# Patient Record
Sex: FEMALE | URBAN - METROPOLITAN AREA
[De-identification: names, ages, dates, MRNs, and addresses within clinical notes are randomized per-mention and may not be internally consistent; named-entity substitution may affect disease eponyms.]

---

## 2023-03-28 ENCOUNTER — APPOINTMENT (RX ONLY)
Dept: URBAN - METROPOLITAN AREA CLINIC 173 | Facility: CLINIC | Age: 28
Setting detail: DERMATOLOGY
End: 2023-03-28

## 2023-03-28 DIAGNOSIS — Z41.9 ENCOUNTER FOR PROCEDURE FOR PURPOSES OTHER THAN REMEDYING HEALTH STATE, UNSPECIFIED: ICD-10-CM

## 2023-03-28 PROCEDURE — ? FILLERS

## 2023-03-28 PROCEDURE — ? COSMETIC CONSULTATION: BOTULINUM TOXIN

## 2023-03-28 PROCEDURE — ? PRESCRIPTION

## 2023-03-28 PROCEDURE — ? BOTOX

## 2023-03-28 PROCEDURE — ? COSMETIC CONSULTATION: FILLERS

## 2023-03-28 RX ADMIN — Medication: at 00:00

## 2023-03-28 NOTE — PROCEDURE: FILLERS
[place] Filler  Volume In Cc: 0
Additional Area 1 Location: face
Anesthesia Type: 1% lidocaine with epinephrine and a 1:10 solution of 8.4% sodium bicarbonate
Expiration Date (Month Year): 6/13/25
Consent: Written consent obtained. Risks include but not limited to bruising, beading, irregular texture, ulceration, infection, allergic reaction, scar formation, incomplete augmentation, temporary nature, procedural pain.
Include Cannula Information In Note?: No
Include Cannula Size?: 25G
Post-Care Instructions: Patient instructed to apply ice to reduce swelling.
Anesthesia Volume In Cc: 0.3
Include Cannula Length?: 1.5 inch
Lot #: [id number]
Lot #: [id number]
Topical Anesthesia?: 2.5% lidocaine, 2.5% prilocaine
Expiration Date (Month Year): 08/04/2021
Detail Level: Zone
Expiration Date (Month Year): 08/31/2020
Include Cannula Information In Note?: Yes
Additional Area 2 Location: Hands
Price (Use Numbers Only, No Special Characters Or $): 575
Filler: RHA 2
Additional Area 3 Location: chin
Map Statment: See Attach Map for Complete Details
Additional Area 2 Location: ear lobes
Aspiration Statement: Aspiration was performed prior to injecting site with filler.
[place]erm[place] Lips Filler Volume In Cc: 0.5
Lot #: [id number]

## 2023-03-28 NOTE — PROCEDURE: BOTOX
Periorbital Skin Units: 0
Show Lcl Units: No
Lot #: [id number]
Show Lateral Platysmal Band Units: Yes
Additional Area 1 Location: Face
Dilution (U/0.1 Cc): 1
Incrementing Botox Units: By 0.5 Units
Additional Area 1 Units: 2
Additional Area 3 Location: masseters
Detail Level: Zone
Expiration Date (Month Year): 9/25
Price (Use Numbers Only, No Special Characters Or $): 40
Additional Area 2 Location: Upper cutaneous lip
Consent: Written consent obtained. Risks include but not limited to lid/brow ptosis, bruising, swelling, diplopia, temporary effect, incomplete chemical denervation.
Post-Care Instructions: Patient instructed to not lie down for 4 hours and limit physical activity for 24 hours. Patient instructed not to travel by airplane for 48 hours.

## 2023-03-29 RX ORDER — PHARMACY COMPOUNDING ACCESSORY
EACH MISCELLANEOUS
Qty: 30 | Refills: 3 | Status: ERX | COMMUNITY
Start: 2023-03-28

## 2023-04-13 ENCOUNTER — APPOINTMENT (RX ONLY)
Dept: URBAN - METROPOLITAN AREA CLINIC 173 | Facility: CLINIC | Age: 28
Setting detail: DERMATOLOGY
End: 2023-04-13

## 2023-04-13 DIAGNOSIS — Z41.9 ENCOUNTER FOR PROCEDURE FOR PURPOSES OTHER THAN REMEDYING HEALTH STATE, UNSPECIFIED: ICD-10-CM

## 2023-04-13 PROCEDURE — ? OTHER (COSMETIC)

## 2023-04-13 NOTE — PROCEDURE: OTHER (COSMETIC)
Other (Free Text): Patient had Botox and filler on 3-28-23, patient is very happy with results. Advised to return in 3.5 months for next apt.
Detail Level: Zone

## 2023-07-18 ENCOUNTER — RX ONLY (OUTPATIENT)
Age: 28
Setting detail: RX ONLY
End: 2023-07-18

## 2023-07-18 ENCOUNTER — APPOINTMENT (RX ONLY)
Dept: URBAN - METROPOLITAN AREA CLINIC 173 | Facility: CLINIC | Age: 28
Setting detail: DERMATOLOGY
End: 2023-07-18

## 2023-07-18 DIAGNOSIS — Z41.9 ENCOUNTER FOR PROCEDURE FOR PURPOSES OTHER THAN REMEDYING HEALTH STATE, UNSPECIFIED: ICD-10-CM

## 2023-07-18 PROCEDURE — ? OTHER (COSMETIC)

## 2023-07-18 PROCEDURE — ? PRESCRIPTION

## 2023-07-18 RX ORDER — LIDOCAINE AND PRILOCAINE 25; 25 MG/G; MG/G
CREAM TOPICAL
Qty: 30 | Refills: 3 | Status: ERX

## 2023-07-18 RX ORDER — LIDOCAINE AND PRILOCAINE 25; 25 MG/G; MG/G
CREAM TOPICAL
Qty: 30 | Refills: 3 | Status: CANCELLED | COMMUNITY
Start: 2023-07-18

## 2023-07-18 RX ADMIN — LIDOCAINE AND PRILOCAINE: 25; 25 CREAM TOPICAL at 00:00

## 2023-07-18 NOTE — PROCEDURE: OTHER (COSMETIC)
Other (Free Text): Patient is holding onto the lip filler very nicely and does not need Botox today, patient advised to return in 3-4 months for lip filler, ( pre and post was given, lido was prescribed).
Detail Level: Zone

## 2023-11-29 ENCOUNTER — APPOINTMENT (RX ONLY)
Dept: URBAN - METROPOLITAN AREA CLINIC 173 | Facility: CLINIC | Age: 28
Setting detail: DERMATOLOGY
End: 2023-11-29

## 2023-11-29 DIAGNOSIS — Z41.9 ENCOUNTER FOR PROCEDURE FOR PURPOSES OTHER THAN REMEDYING HEALTH STATE, UNSPECIFIED: ICD-10-CM

## 2023-11-29 PROCEDURE — ? FILLERS

## 2023-11-29 NOTE — PROCEDURE: FILLERS
Anesthesia Type: 1% lidocaine with epinephrine and a 1:10 solution of 8.4% sodium bicarbonate
Cheeks Filler Volume In Cc: 0
Include Cannula Length?: 1.5 inch
Vermilion Lips Filler Volume In Cc: 0.5
Lot #: [id number]
Include Cannula Information In Note?: No
Include Cannula Size?: 25G
Additional Area 1 Location: face
Anesthesia Volume In Cc: 0.3
Lot #: [id number]
Expiration Date (Month Year): 1/30/26
Include Cannula Information In Note?: Yes
Topical Anesthesia?: 2.5% lidocaine, 2.5% prilocaine
Expiration Date (Month Year): 08/04/2021
Detail Level: Zone
Price (Use Numbers Only, No Special Characters Or $): 914
Lot #: [id number]
Map Statment: See Attach Map for Complete Details
Additional Area 2 Location: Hands
Expiration Date (Month Year): 08/31/2020
Additional Area 2 Location: ear lobes
Consent: Written consent obtained. Risks include but not limited to bruising, beading, irregular texture, ulceration, infection, allergic reaction, scar formation, incomplete augmentation, temporary nature, procedural pain.
Additional Area 3 Location: chin
Aspiration Statement: Aspiration was performed prior to injecting site with filler.
Post-Care Instructions: Patient instructed to apply ice to reduce swelling.
Filler: RHA 2

## 2025-08-05 ENCOUNTER — APPOINTMENT (OUTPATIENT)
Dept: URBAN - METROPOLITAN AREA CLINIC 173 | Facility: CLINIC | Age: 30
Setting detail: DERMATOLOGY
End: 2025-08-05

## 2025-08-05 DIAGNOSIS — Z41.9 ENCOUNTER FOR PROCEDURE FOR PURPOSES OTHER THAN REMEDYING HEALTH STATE, UNSPECIFIED: ICD-10-CM

## 2025-08-05 PROCEDURE — ? FILLERS
